# Patient Record
Sex: FEMALE | Race: OTHER
[De-identification: names, ages, dates, MRNs, and addresses within clinical notes are randomized per-mention and may not be internally consistent; named-entity substitution may affect disease eponyms.]

---

## 2019-07-23 ENCOUNTER — HOSPITAL ENCOUNTER (EMERGENCY)
Dept: HOSPITAL 25 - ED | Age: 19
Discharge: LEFT BEFORE DISCHARGE | End: 2019-07-23
Payer: SELF-PAY

## 2019-07-23 VITALS — DIASTOLIC BLOOD PRESSURE: 72 MMHG | SYSTOLIC BLOOD PRESSURE: 105 MMHG

## 2019-07-23 DIAGNOSIS — E11.65: ICD-10-CM

## 2019-07-23 DIAGNOSIS — R06.02: ICD-10-CM

## 2019-07-23 DIAGNOSIS — R55: Primary | ICD-10-CM

## 2019-07-23 DIAGNOSIS — Z79.4: ICD-10-CM

## 2019-07-23 LAB
ALBUMIN SERPL BCG-MCNC: 4.4 G/DL (ref 3.2–5.2)
ALBUMIN/GLOB SERPL: 1.4 {RATIO} (ref 1–3)
ALP SERPL-CCNC: 67 U/L (ref 34–104)
ALT SERPL W P-5'-P-CCNC: 10 U/L (ref 7–52)
ANION GAP SERPL CALC-SCNC: 10 MMOL/L (ref 2–11)
APTT PPP: 27.2 SECONDS (ref 26–38)
AST SERPL-CCNC: 13 U/L (ref 13–39)
BASOPHILS # BLD AUTO: 0 10^3/UL (ref 0–0.2)
BUN SERPL-MCNC: 17 MG/DL (ref 6–24)
BUN/CREAT SERPL: 24.3 (ref 8–20)
CALCIUM SERPL-MCNC: 10 MG/DL (ref 8.6–10.3)
CHLORIDE SERPL-SCNC: 102 MMOL/L (ref 101–111)
EOSINOPHIL # BLD AUTO: 0 10^3/UL (ref 0–0.6)
GLOBULIN SER CALC-MCNC: 3.1 G/DL (ref 2–4)
GLUCOSE SERPL-MCNC: 279 MG/DL (ref 70–100)
HCG SERPL QL: < 0.6 MIU/ML
HCO3 SERPL-SCNC: 24 MMOL/L (ref 22–32)
HCT VFR BLD AUTO: 37 % (ref 35–47)
HGB BLD-MCNC: 12.3 G/DL (ref 12–16)
INR PPP/BLD: 1.1 (ref 0.82–1.09)
LYMPHOCYTES # BLD AUTO: 1.3 10^3/UL (ref 1–4.8)
MAGNESIUM SERPL-MCNC: 1.8 MG/DL (ref 1.9–2.7)
MCH RBC QN AUTO: 29 PG (ref 27–31)
MCHC RBC AUTO-ENTMCNC: 33 G/DL (ref 31–36)
MCV RBC AUTO: 89 FL (ref 80–97)
MONOCYTES # BLD AUTO: 0.3 10^3/UL (ref 0–0.8)
NEUTROPHILS # BLD AUTO: 7.1 10^3/UL (ref 1.5–7.7)
NRBC # BLD AUTO: 0 10^3/UL
NRBC BLD QL AUTO: 0
PLATELET # BLD AUTO: 193 10^3/UL (ref 150–450)
POTASSIUM SERPL-SCNC: 4.5 MMOL/L (ref 3.5–5)
PROT SERPL-MCNC: 7.5 G/DL (ref 6.4–8.9)
RBC # BLD AUTO: 4.17 10^6 /UL (ref 3.7–4.87)
SODIUM SERPL-SCNC: 136 MMOL/L (ref 135–145)
TROPONIN I SERPL-MCNC: 0 NG/ML (ref ?–0.04)
TSH SERPL-ACNC: 1.62 MCIU/ML (ref 0.34–5.6)
WBC # BLD AUTO: 8.7 10^3/UL (ref 3.5–10.8)

## 2019-07-23 PROCEDURE — 96360 HYDRATION IV INFUSION INIT: CPT

## 2019-07-23 PROCEDURE — 80053 COMPREHEN METABOLIC PANEL: CPT

## 2019-07-23 PROCEDURE — 83880 ASSAY OF NATRIURETIC PEPTIDE: CPT

## 2019-07-23 PROCEDURE — 99282 EMERGENCY DEPT VISIT SF MDM: CPT

## 2019-07-23 PROCEDURE — 85610 PROTHROMBIN TIME: CPT

## 2019-07-23 PROCEDURE — 84484 ASSAY OF TROPONIN QUANT: CPT

## 2019-07-23 PROCEDURE — 84702 CHORIONIC GONADOTROPIN TEST: CPT

## 2019-07-23 PROCEDURE — 83735 ASSAY OF MAGNESIUM: CPT

## 2019-07-23 PROCEDURE — 85025 COMPLETE CBC W/AUTO DIFF WBC: CPT

## 2019-07-23 PROCEDURE — 84443 ASSAY THYROID STIM HORMONE: CPT

## 2019-07-23 PROCEDURE — 85379 FIBRIN DEGRADATION QUANT: CPT

## 2019-07-23 PROCEDURE — 36415 COLL VENOUS BLD VENIPUNCTURE: CPT

## 2019-07-23 PROCEDURE — 85730 THROMBOPLASTIN TIME PARTIAL: CPT

## 2019-07-23 PROCEDURE — 71045 X-RAY EXAM CHEST 1 VIEW: CPT

## 2019-07-23 PROCEDURE — 83605 ASSAY OF LACTIC ACID: CPT

## 2019-07-23 PROCEDURE — 93005 ELECTROCARDIOGRAM TRACING: CPT

## 2019-07-23 NOTE — ED
Syncope/Near Syncope





- HPI Summary


HPI Summary: 





This patient is a 18 year old F presenting to [facility] by EMS with a chief 

complaint of syncope PTA on today. Pt did not eat today. Patient reports vision 

impairment, LOC, lightheaded, SOB. Patient denies CP. Patients last menstrual 

period is 6/25/19. 











- History Of Current Complaint


Chief Complaint: EDSyncope


Time Seen by Provider: 07/23/19 15:23


Hx Obtained From: Patient


Onset/Duration: Sudden Onset, Resolved


Timing: Seconds


Context: Witnessed, Loss Of Consciousness


Associated Head Trauma: No


Aggravating Factor(s): Other - not eating


Alleviating Factor(s): Spontaneous Resolution


Associated Signs And Symptoms: Negative - CP, Lightheadedness, Shortness Of 

Breath, Other - vision impairment





- Allergies/Home Medications


Home Medications: 


 Home Medications





Insulin ASPART (NF) [Novolog (NF)] 0 units SUBCUT AC 07/23/19 [History 

Confirmed 07/23/19]


Insulin Degludec [Tresiba Flextouch] 6 units SUBCUT DAILY 07/23/19 [History 

Confirmed 07/23/19]











PMH/Surg Hx/FS Hx/Imm Hx


Endocrine/Hematology History: Reports: Hx Diabetes


Sensory History: 


   Denies: Hx Legally Blind, Hx Deafness


EENT History: 


   Denies: Hx Deafness


Infectious Disease History: No


Infectious Disease History: 


   Denies: Traveled Outside the [place] in Last 30 Days





- Family History


Known Family History: 


   Negative: Hypertension, Diabetes





- Social History


Occupation: Student


Lives: Dormitory/Roommates


Alcohol Use: None


Hx Substance Use: No


Substance Use Type: Reports: None


Hx Tobacco Use: No


Smoking Status (MU): Never Smoked Tobacco





Review of Systems


Negative: Chest Pain


Positive: Shortness Of Breath


Neurological: Other - lightheaded, vision impairment


Positive: Syncope


All Other Systems Reviewed And Are Negative: Yes





Physical Exam





- Summary


Physical Exam Summary: 


GENERAL: Patient is a well-developed and nourished F who is lying comfortable 

in the stretcher. Patient is not in any acute respiratory distress.


HEAD AND FACE: Normocephalic


EYES: PERRLA, EOMI x 2.


EARS: Hearing grossly intact.


MOUTH: Oropharynx within normal limits.


NECK: Supple, trachea is midline, no adenopathy, no JVD, no carotid bruit.


CHEST: Symmetric, no tenderness at palpation


LUNGS: Clear to auscultation bilaterally. No wheezing or crackles.


CVS: Regular rate and rhythm, S1 and S2 present, no murmurs or gallops 

appreciated.


ABDOMEN: Soft, non-tender. Bowel sounds are normal. No abnormal abdominal 

pulsations.


EXTREMITIES: Full ROM in all major joints, no edema, no cyanosis or clubbing.


NEURO: Alert and oriented x 3. No acute neurological deficits. Speech is normal 

and follows commands.


SKIN: Dry and warm





Triage Information Reviewed: Yes


Vital Signs On Initial Exam: 


 Initial Vitals











Temp Pulse Resp BP Pulse Ox


 


 97.8 F   61   16   109/65   100 


 


 07/23/19 15:12  07/23/19 15:12  07/23/19 15:12  07/23/19 15:12  07/23/19 15:12











Vital Signs Reviewed: Yes





Diagnostics





- Vital Signs


 Vital Signs











  Temp Pulse Resp BP Pulse Ox


 


 07/23/19 15:12  97.8 F  61  16  109/65  100














- Laboratory


Result Diagrams: 


 07/23/19 15:29





 07/23/19 15:29


Lab Statement: Any lab studies that have been ordered have been reviewed, and 

results considered in the medical decision making process.





- Radiology


  ** CXR


Radiology Interpretation Completed By: Radiologist


Summary of Radiographic Findings: CXR reveals, per radiologist, IMPRESSION: NO 

ACTIVE CARDIOPULMONARY DISEASE IS NOTED. ED physician has reviewed this 

radiology report.





- EKG


  ** 1533


Cardiac Rate: Bradycardia - 54 bpm


EKG Rhythm: Sinus Bradycardia


Summary of EKG Findings: An EKG at 15:33 reveals sinus bradycardia 54 bpm, 

incomplete right bundle.





Re-Evaluation





- Re-Evaluation


  ** First Eval


Re-Evaluation Time: 17:03


Comment: Pt mentioned she is diabetic and she will be given fluids.





  ** Second Eval


Re-Evaluation Time: 18:07


Comment: Discussed potential consequences of leaving AMA





Course/Dx


Course Of Treatment: This patient is a 18 year old F presenting to [facility] by EMS 

with a chief complaint of syncope PTA today.  Physical Exam Findings are nml.  

Blood work obtained. BUN/Creatinine Ratio is 24.3, Glucose is 279, Lactic Acid 

is 3.8, and Magnesium is 1.8. INR (Anticoag Therapy) 1.10.  Patients glucose 

is elevated, and pt has hyperglycemia but there is no evidence of DKA.  An EKG 

at 15:33 reveals sinus bradycardia 54 bpm, incomplete right bundle.  CXR reveals

, per radiologist, IMPRESSION: NO ACTIVE CARDIOPULMONARY DISEASE IS NOTED.  In 

the ED course the patient was given fluids.  I discussed results with patient, 

and wanted her to be given more fluids and a re-evaluation. Discussed possible 

consequences of leaving AMA such as disability and death.  Pt left AGAINST 

MEDICAL ADVISE.





- Diagnoses


Provider Diagnoses: 


 Syncope, Hyperglycemia








Discharge





- Sign-Out/Discharge


Documenting (check all that apply): Patient Departure - AMA


Patient Received Moderate/Deep Sedation with Procedure: No





- Discharge Plan


Condition: Fair


Disposition: AGAINST MEDICAL ADVICE


Patient Education Materials:  Syncope (ED), Diabetic Hyperglycemia (ED)


Referrals: 


[facility] - [facility]ERNATH[facility] [[employer], APPLICATION, OTHER] - 3 Days


Additional Instructions: 


Follow up with your primary care physician in 1-3 days.


RETURN TO THE EMERGENCY DEPARTMENT FOR CHANGING OR WORSENING SYMPTOMS.








- Billing Disposition and Condition


Condition: FAIR


Disposition: Against Medical Advice





- Attestation Statements


Document Initiated by Scribe: Yes


Documenting Scribe: Halima Calvin


Provider For Whom Scribe is Documenting (Include Credential): Dr. Irasema Mendoza MD


Scribe Attestation: 


Halima KUHN, scribed for Dr. Irasema Mendoza MD on 07/24/19 at 0803. 


Scribe Documentation Reviewed: Yes


Provider Attestation: 


The documentation as recorded by the Halima rosenthal accurately 

reflects the service I personally performed and the decisions made by me, Dr. Irasema Mendoza MD


Status of Scribe Document: Viewed